# Patient Record
Sex: FEMALE | Race: WHITE | NOT HISPANIC OR LATINO | Employment: FULL TIME | URBAN - METROPOLITAN AREA
[De-identification: names, ages, dates, MRNs, and addresses within clinical notes are randomized per-mention and may not be internally consistent; named-entity substitution may affect disease eponyms.]

---

## 2024-03-07 ENCOUNTER — HOSPITAL ENCOUNTER (OUTPATIENT)
Dept: RADIOLOGY | Facility: EXTERNAL LOCATION | Age: 49
Discharge: HOME | End: 2024-03-07

## 2024-03-07 ENCOUNTER — HOSPITAL ENCOUNTER (OUTPATIENT)
Dept: RADIOLOGY | Facility: EXTERNAL LOCATION | Age: 49
Discharge: HOME | End: 2024-03-07
Payer: COMMERCIAL

## 2024-03-07 DIAGNOSIS — M25.522 PAIN IN LEFT ELBOW: ICD-10-CM

## 2024-03-07 DIAGNOSIS — M25.512 LEFT SHOULDER PAIN, UNSPECIFIED CHRONICITY: ICD-10-CM

## 2024-03-08 ENCOUNTER — OFFICE VISIT (OUTPATIENT)
Dept: ORTHOPEDIC SURGERY | Facility: HOSPITAL | Age: 49
End: 2024-03-08
Payer: COMMERCIAL

## 2024-03-08 DIAGNOSIS — S52.125A NONDISPLACED FRACTURE OF HEAD OF LEFT RADIUS, INITIAL ENCOUNTER FOR CLOSED FRACTURE: Primary | ICD-10-CM

## 2024-03-08 PROCEDURE — 99204 OFFICE O/P NEW MOD 45 MIN: CPT | Performed by: PHYSICIAN ASSISTANT

## 2024-03-08 PROCEDURE — 99214 OFFICE O/P EST MOD 30 MIN: CPT | Performed by: PHYSICIAN ASSISTANT

## 2024-03-08 ASSESSMENT — PAIN SCALES - GENERAL: PAINLEVEL_OUTOF10: 5 - MODERATE PAIN

## 2024-03-08 ASSESSMENT — PAIN - FUNCTIONAL ASSESSMENT: PAIN_FUNCTIONAL_ASSESSMENT: 0-10

## 2024-03-08 ASSESSMENT — PAIN DESCRIPTION - DESCRIPTORS: DESCRIPTORS: DULL;THROBBING

## 2024-03-08 NOTE — PROGRESS NOTES
NPV-   History of Present Illness  48 y.o.female RHD presents at same day walk in clinic for left elbow    1. Nondisplaced fracture of head of left radius, initial encounter for closed fracture        Mechanism of injury: fall while walking the dog  Date of Injury/Pain: 3/7/24  Location of pain: proximal radius  Frequency of Pain: worse with rotation or extension  Associated symptoms? Swelling.  Previous treatment?  UC, xrays, sling      On examination of the left elbow;  Normal alignment;  Minimal swelling, no ecchymosis   Slight decreased  elbow extension and normal elbow flexion, pronation and supination, Normal ROM in wrist flexion, pronation and supination, ROM finger and thumb normal  Normal strength in elbow extension, flexion, pronation and supination; wrist finger and thumb strength normal; normal  strength  Tenderness to palpation: radial head   No tenderness over the olecranon UCL, ulna, medial or lateral epicondyles, scaphoid  NVI, good cap refill    I personally reviewed the patient's x-ray images and reports of the left elbow. The xrays show a small fracture of the radial head.  No other fractures or dislocation.  Normal joint spacing    ASSESSMENT: left elbow nondisplaced radial head fracture    PLAN: I discussed with the patient the differential diagnosis, complex overuse and degenerative related nature of the condition(s) and available treatment option(s). Patient will continue sling. She was instructed on ROM exercises. Patient will increase their dietary calcium intake (milk,yogurt) and should get 1200 mg of calcium per day. They will also take a vitamin D supplement. All the patient's questions were answered. The patient agrees with the above plan.  Follow up in 3 weeks

## 2024-03-08 NOTE — PATIENT INSTRUCTIONS
Wear your sling for comfort     You can use OTC Voltaren gel or aspercream and apply it to the injured area.    Ice and elevate supported at the calf with no pressure on the heel to reduce swelling.    Patient will increase their dietary calcium intake (milk,yogurt) and should get 1200 mg of calcium per day. They will also take a vitamin D supplement.    Follow up in 3 weeks

## 2024-03-27 DIAGNOSIS — S52.125A NONDISPLACED FRACTURE OF HEAD OF LEFT RADIUS, INITIAL ENCOUNTER FOR CLOSED FRACTURE: Primary | ICD-10-CM

## 2024-03-28 ENCOUNTER — OFFICE VISIT (OUTPATIENT)
Dept: ORTHOPEDIC SURGERY | Facility: CLINIC | Age: 49
End: 2024-03-28
Payer: COMMERCIAL

## 2024-03-28 ENCOUNTER — HOSPITAL ENCOUNTER (OUTPATIENT)
Dept: RADIOLOGY | Facility: CLINIC | Age: 49
Discharge: HOME | End: 2024-03-28
Payer: COMMERCIAL

## 2024-03-28 VITALS — HEIGHT: 69 IN | WEIGHT: 115 LBS | BODY MASS INDEX: 17.03 KG/M2

## 2024-03-28 DIAGNOSIS — S52.125A NONDISPLACED FRACTURE OF HEAD OF LEFT RADIUS, INITIAL ENCOUNTER FOR CLOSED FRACTURE: ICD-10-CM

## 2024-03-28 DIAGNOSIS — S52.125A NONDISPLACED FRACTURE OF HEAD OF LEFT RADIUS, INITIAL ENCOUNTER FOR CLOSED FRACTURE: Primary | ICD-10-CM

## 2024-03-28 PROCEDURE — 99213 OFFICE O/P EST LOW 20 MIN: CPT | Performed by: PHYSICIAN ASSISTANT

## 2024-03-28 PROCEDURE — 73080 X-RAY EXAM OF ELBOW: CPT | Mod: LEFT SIDE | Performed by: RADIOLOGY

## 2024-03-28 PROCEDURE — 73080 X-RAY EXAM OF ELBOW: CPT | Mod: LT

## 2024-03-28 PROCEDURE — 1036F TOBACCO NON-USER: CPT | Performed by: PHYSICIAN ASSISTANT

## 2024-03-28 ASSESSMENT — PAIN DESCRIPTION - DESCRIPTORS: DESCRIPTORS: ACHING

## 2024-03-28 ASSESSMENT — PAIN SCALES - GENERAL: PAINLEVEL_OUTOF10: 2

## 2024-03-28 ASSESSMENT — PAIN - FUNCTIONAL ASSESSMENT: PAIN_FUNCTIONAL_ASSESSMENT: 0-10

## 2024-03-28 NOTE — PROGRESS NOTES
48-year-old female who presents to clinic today for follow-up of left upper extremity injury.  Her injury occurred on 3/7/2024 while walking the dog she is unsure how she injured the left upper extremity.  She was seen in the urgent care on the day of injury where x-rays were obtained and revealed a nondisplaced radial head fracture.  She then followed up with our Ortho injury clinic.  She overall has been doing well did not have a lot of pain associated with elbow has some discomfort with full range of motion.  Was having a lot of discomfort of the wrist however this has slowly started to improve.  No other complaints today.    Patient's self reported past medical history, medications, allergies, surgical history, family and social history as well as a 10 point review of systems has been documented in the new patient intake form and scanned into the patient's electronic medical record. Pertinent findings are documented in the HPI.    Physical Examination Findings:  Constitutional: Appears well-developed and well-nourished.  Head: Normocephalic and atraumatic.  Eyes: Pupils are equal and round.  Cardiovascular: Intact distal pulses.   Respiratory: Effort normal. No respiratory distress.  Neurologic: Alert and oriented to person, place, and time.  Skin: Skin is warm and dry.  Hematologic / Lymphatic: No lymphedema, lymphangitis.  Psychiatric: normal mood and affect. Behavior is normal.   Musculoskeletal: Left elbow without any significant swelling.  Minimal tenderness to palpation over the radial head full forearm rotation full elbow extension and flexion with slight hesitation.  Left wrist with full range of motion no anatomic snuffbox tenderness tenderness over the distal radius or distal ulna or radiocarpal joint.  Full digital finger range of motion.    New x-rays taken today of the left elbow reveal improved posterior fat pad sign, no evidence of any changes of alignment of nondisplaced radial head  fracture    Impression: Left radial head fracture, left wrist sprain    Plan: Overall she is doing well we discussed that she may slowly start to increase her activity as she tolerates.  We discussed the importance of continued range of motion to avoid stiffness and motion limitations.  In regards to her left wrist she may wear a wrist brace as needed.  If she continues to have issues I will recommend x-ray of the left wrist.  We will allow her to follow-up with our office as needed.    Nargis Hood PA-C  Department of Orthopaedic Surgery  [facility]    Dictation performed with the use of voice recognition software. Syntax and grammatical errors may exist.